# Patient Record
Sex: FEMALE | Race: WHITE | Employment: UNEMPLOYED | URBAN - METROPOLITAN AREA
[De-identification: names, ages, dates, MRNs, and addresses within clinical notes are randomized per-mention and may not be internally consistent; named-entity substitution may affect disease eponyms.]

---

## 2017-02-22 ENCOUNTER — HOSPITAL ENCOUNTER (OUTPATIENT)
Dept: SLEEP MEDICINE | Age: 22
Discharge: HOME | End: 2017-02-22
Payer: COMMERCIAL

## 2017-02-22 ENCOUNTER — TELEPHONE (OUTPATIENT)
Dept: SLEEP MEDICINE | Age: 22
End: 2017-02-22

## 2017-02-22 DIAGNOSIS — K21.9 ESOPHAGEAL REFLUX: ICD-10-CM

## 2017-02-22 DIAGNOSIS — F32.A DEPRESSION: ICD-10-CM

## 2017-02-22 DIAGNOSIS — G47.33 OSA (OBSTRUCTIVE SLEEP APNEA): ICD-10-CM

## 2017-02-22 PROCEDURE — 95810 POLYSOM 6/> YRS 4/> PARAM: CPT

## 2017-02-24 ENCOUNTER — DOCUMENTATION ONLY (OUTPATIENT)
Dept: SLEEP MEDICINE | Age: 22
End: 2017-02-24

## 2019-06-27 ENCOUNTER — OFFICE VISIT (OUTPATIENT)
Dept: FAMILY MEDICINE CLINIC | Age: 24
End: 2019-06-27

## 2019-06-27 VITALS
WEIGHT: 277 LBS | DIASTOLIC BLOOD PRESSURE: 85 MMHG | SYSTOLIC BLOOD PRESSURE: 122 MMHG | HEART RATE: 104 BPM | BODY MASS INDEX: 47.29 KG/M2 | TEMPERATURE: 96.7 F | OXYGEN SATURATION: 97 % | RESPIRATION RATE: 16 BRPM | HEIGHT: 64 IN

## 2019-06-27 DIAGNOSIS — R35.0 URINARY FREQUENCY: ICD-10-CM

## 2019-06-27 DIAGNOSIS — Z76.89 ENCOUNTER TO ESTABLISH CARE: ICD-10-CM

## 2019-06-27 DIAGNOSIS — R32 URINARY INCONTINENCE, UNSPECIFIED TYPE: ICD-10-CM

## 2019-06-27 DIAGNOSIS — R35.0 URINARY FREQUENCY: Primary | ICD-10-CM

## 2019-06-27 DIAGNOSIS — Z13.29 SCREENING FOR THYROID DISORDER: ICD-10-CM

## 2019-06-27 DIAGNOSIS — E66.01 OBESITY, MORBID (HCC): ICD-10-CM

## 2019-06-27 RX ORDER — VILAZODONE HYDROCHLORIDE 10 MG/1
20 TABLET ORAL DAILY
COMMUNITY

## 2019-06-27 RX ORDER — SUMATRIPTAN 100 MG/1
100 TABLET, FILM COATED ORAL
COMMUNITY

## 2019-06-27 RX ORDER — GABAPENTIN 300 MG/1
300 CAPSULE ORAL 3 TIMES DAILY
COMMUNITY

## 2019-06-27 RX ORDER — AMITRIPTYLINE HYDROCHLORIDE 25 MG/1
TABLET, FILM COATED ORAL
COMMUNITY

## 2019-06-27 RX ORDER — PRAZOSIN HYDROCHLORIDE 2 MG/1
2 CAPSULE ORAL
COMMUNITY

## 2019-06-27 NOTE — PROGRESS NOTES
Chief Complaint   Patient presents with   [facility] Establish Care    Urgency    Incontinence     urinary incontinence     1. Have you been to the ER, urgent care clinic since your last visit? Hospitalized since your last visit? [facility]    2. Have you seen or consulted any other health care providers outside of the [address] [place] [address] since your last visit? Include any pap smears or colon screening.  Neurology-Yury Anders (migraines), Endocrinologist-Dr Carrasco (pituitary tumor),

## 2019-06-27 NOTE — PROGRESS NOTES
ESTABLISH CARE VISIT    SUBJECTIVE:     Chief Complaint   Patient presents with    Establish Care    Urgency    Incontinence     urinary incontinence       HPI: 20 y.o.  female  has a past medical history of Autism, Clinical depression, Headache, Pituitary tumor, and Sleep disorder. is here for the above chief complaint(s). Headaches  Patient sees neurology, Dr. Cinthia Schneider. Patient was diagnosed with migraines, takes sumatriptan as needed. And last was 1-2 months. Next visit will be in 6mo-1 year. He refills sumatriptan. Patient states that she has worse migraines in the summer. Sleep study  Patient was dianosed with low O2 in the middle of the night, not sleep apnea. She does not wear CPAP machine 2 to anxiety concerns. Depression/PTSD  Patient sees a therapist, but she has not been seeing them  Renny Evans, last visit was 1 month ago. Meds were changed on Monday, now taking gabapentin 300mg BID and 600mg AT NIGHT. Patient also takes vybrid 20mg daily for depressionn. Patient denies SI, HI, AH, VH. Insomnia  Patient states that she takes amiptryptiline and elavil nightly for sleep. She has been on Klonipin in the psat and this did not work well for her. Patient tried melatonin. Patient states that insomnia is well controlled. Pituitary Tumor  2 months ago, MRI recent. Pituitary tumor was discovered 6 months ago, this was follow-up MRI. Sees endocrinology for management, will see them every 6 months. Social  Patient's father has frontotemporal dementia, some social struggles in the home. But managing as best as she can. Urinary symptoms  Patient was was having some urinary frequency starting about 3 months ago and the symptoms are intermittent. Patient 's mother states that she sees the patient going frequently through the day. She has no c/o urinary pain or burning. Patient was also having some back pain in the last 3 months. Patient does not currently have any back pain. Patient states that she has had an increase in bowel movements. Patient has incontinence, says that she when she has to go, she will hold it in public and then will be unable to make it to the bathroom in time. Patient has regular menstrual periods, no concerns. Denies any n/v. Patent states that she does have some diarrhea, but this is dependent on diet. She denies any other abdominal pain,n/v/d. Health Maintenance:    Eye -  no complaints, last eye exam: years,   Oral Health -  no complaints, last exam: needs to update  Hearing -  no complaints. U/A -  no UTI sxs. Cardiac- denies history, denies chest pain. GYN- never had, not sexually active  Colonoscopy - no colon cancer in the family, screen at 48      Review of Systems   Constitutional: Negative for chills, fever, malaise/fatigue and weight loss. Eyes: Negative for blurred vision, double vision and pain. Respiratory: Negative for cough, sputum production, shortness of breath and wheezing. Cardiovascular: Negative for chest pain, palpitations, orthopnea, claudication and leg swelling. Gastrointestinal: Negative for abdominal pain, constipation, diarrhea, nausea and vomiting. Genitourinary: Positive for frequency. Negative for dysuria, flank pain, hematuria and urgency. Neurological: Positive for headaches (chronic migraines, no new headaches). Negative for dizziness, tingling, tremors, sensory change, speech change, focal weakness, seizures and weakness. Psychiatric/Behavioral: Positive for depression. Negative for hallucinations, memory loss, substance abuse and suicidal ideas. The patient is nervous/anxious and has insomnia. @[facility]@  Current Outpatient Medications   Medication Sig    gabapentin (NEURONTIN) 300 mg capsule Take 300 mg by mouth three (3) times daily. I cap by mouth QAM and QNOON and 2 caps by mouth QHS    vilazodone (VIIBRYD) 10 mg tab tablet Take 20 mg by mouth daily.     amitriptyline (ELAVIL) 25 mg tablet Take  by mouth nightly.  prazosin (MINIPRESS) 2 mg capsule Take 2 mg by mouth nightly.  SUMAtriptan (IMITREX) 100 mg tablet Take 100 mg by mouth once as needed for Migraine. No current facility-administered medications for this visit. Health Maintenance   Topic Date Due    HPV Age 9Y-34Y (3 - Female 3-dose series) 07/06/2008    DTaP/Tdap/Td series (1 - Tdap) 07/06/2014    PAP AKA CERVICAL CYTOLOGY  07/06/2014    Influenza Age 5 to Adult  08/01/2019    Pneumococcal 0-64 years  Aged Out       Medications and Allergies: Reviewed and confirmed in the chart    Past Medical Hx: Reviewed and confirmed in the chart  Past Medical History:   Diagnosis Date    Autism     Clinical depression     Headache     Pituitary tumor     Sleep disorder        Patient Active Problem List   Diagnosis Code    Obesity, morbid ([facility] [address] E66.01    Autism F84.0    Clinical depression F32.9    Headache R51    Pituitary tumor D49.7    Sleep disorder G47.9       Family Hx, Surgical Hx, Social Hx: Reviewed and updated in EMR    OBJECTIVE:  Vitals:    06/27/19 1353   BP: 122/85   Pulse: (!) 104   Resp: 16   Temp: 96.7 °F (35.9 °C)   TempSrc: Oral   SpO2: 97%   Weight: 277 lb (125.6 kg)   Height: 5' 4\" (1.626 m)       BP Readings from Last 3 Encounters:   06/27/19 122/85     Wt Readings from Last 3 Encounters:   06/27/19 277 lb (125.6 kg)       Physical Exam   Constitutional: She is oriented to person, place, and time and well-developed, well-nourished, and in no distress. No distress. Neck: Normal range of motion. Neck supple. No thyromegaly present. Cardiovascular: Normal rate, regular rhythm, normal heart sounds and intact distal pulses. Exam reveals no gallop and no friction rub. No murmur heard. Pulmonary/Chest: Effort normal and breath sounds normal. No respiratory distress. She has no wheezes. She has no rales. She exhibits no tenderness. Abdominal: Soft. Normal appearance.  There is no hepatosplenomegaly. There is no tenderness. There is no CVA tenderness. Lymphadenopathy:     She has no cervical adenopathy. Neurological: She is alert and oriented to person, place, and time. Skin: Skin is warm and dry. She is not diaphoretic. Psychiatric: Mood, memory, affect and judgment normal.   Nursing note and vitals reviewed. Nursing Notes Reviewed    ASSESSMENT AND PLAN  Diagnoses and all orders for this visit:    1. Urinary frequency  -     XR ABD (KUB); Future  -     CULTURE, URINE; Future  -     URINALYSIS W/ RFLX MICROSCOPIC; Future  -     CULTURE, URINE; Future    2. Obesity, morbid ([facility] [address]  The patient is asked to make an attempt to improve diet and exercise patterns to aid in medical management of this problem. 3. Urinary incontinence, unspecified type  -     METABOLIC PANEL, COMPREHENSIVE; Future  -     HEMOGLOBIN A1C WITH EAG; Future  -     XR ABD (KUB); Future  -     CULTURE, URINE; Future    4. Screening for thyroid disorder  -     TSH 3RD GENERATION; Future  -     T4, FREE; Future    5. Encounter to establish care  -     CBC WITH AUTOMATED DIFF; Future  -     METABOLIC PANEL, COMPREHENSIVE; Future  -     T4, FREE  -     TSH 3RD GENERATION  -     HEMOGLOBIN A1C W/O EAG        Orders Placed This Encounter    gabapentin (NEURONTIN) 300 mg capsule    vilazodone (VIIBRYD) 10 mg tab tablet    amitriptyline (ELAVIL) 25 mg tablet    prazosin (MINIPRESS) 2 mg capsule    SUMAtriptan (IMITREX) 100 mg tablet         I have discussed the diagnosis with the patient and the intended plan as seen in the above orders. The patient has received an after-visit summary and questions were answered concerning future plans. I have discussed medication side effects and warnings with the patient as well. I have reviewed the plan of care with the patient, accepted their input and they are in agreement with the treatment goals.     More than 50% of this 30 min visit was spent counseling the patient face to face about etiology and treatment of health conditions outlined in assessment and plan        Sherry Tan [address], [address] [place] [address], [address] [place], [place] [address] [phone]  [place] [phone]  [phone]

## 2019-06-28 ENCOUNTER — TELEPHONE (OUTPATIENT)
Dept: FAMILY MEDICINE CLINIC | Age: 24
End: 2019-06-28

## 2019-06-28 DIAGNOSIS — R32 URINARY INCONTINENCE, UNSPECIFIED TYPE: ICD-10-CM

## 2019-06-28 DIAGNOSIS — Z13.29 SCREENING FOR THYROID DISORDER: ICD-10-CM

## 2019-06-28 DIAGNOSIS — Z76.89 ENCOUNTER TO ESTABLISH CARE: ICD-10-CM

## 2019-06-28 LAB
A-G RATIO,AGRAT: 1.7 RATIO (ref 1.1–2.6)
ABSOLUTE LYMPHOCYTE COUNT, 10803: 2.5 K/UL (ref 1–4.8)
ALBUMIN SERPL-MCNC: 4.5 G/DL (ref 3.5–5)
ALP SERPL-CCNC: 62 U/L (ref 25–115)
ALT SERPL-CCNC: 27 U/L (ref 5–40)
ANION GAP SERPL CALC-SCNC: 19 MMOL/L
AST SERPL W P-5'-P-CCNC: 29 U/L (ref 10–37)
AVG GLU, 10930: 111 MG/DL (ref 91–123)
BASOPHILS # BLD: 0 K/UL (ref 0–0.2)
BASOPHILS NFR BLD: 0 % (ref 0–2)
BILIRUB SERPL-MCNC: 0.5 MG/DL (ref 0.2–1.2)
BILIRUB UR QL: NEGATIVE
BUN SERPL-MCNC: 14 MG/DL (ref 6–22)
CALCIUM SERPL-MCNC: 9.5 MG/DL (ref 8.4–10.5)
CHLORIDE SERPL-SCNC: 98 MMOL/L (ref 98–110)
CO2 SERPL-SCNC: 22 MMOL/L (ref 20–32)
CREAT SERPL-MCNC: 0.7 MG/DL (ref 0.5–1.2)
EOSINOPHIL # BLD: 0.2 K/UL (ref 0–0.5)
EOSINOPHIL NFR BLD: 1 % (ref 0–6)
ERYTHROCYTE [DISTWIDTH] IN BLOOD BY AUTOMATED COUNT: 15.6 % (ref 10–15.5)
GFRAA, 66117: >60
GFRNA, 66118: >60
GLOBULIN,GLOB: 2.7 G/DL (ref 2–4)
GLUCOSE SERPL-MCNC: 48 MG/DL (ref 70–99)
GLUCOSE UR QL: NEGATIVE MG/DL
GRANULOCYTES,GRANS: 69 % (ref 40–75)
HBA1C MFR BLD HPLC: 5.5 % (ref 4.8–5.9)
HCT VFR BLD AUTO: 39.2 % (ref 35.1–46.5)
HGB BLD-MCNC: 12.1 G/DL (ref 11.7–15.5)
HGB UR QL STRIP: NEGATIVE
KETONES UR QL STRIP.AUTO: NEGATIVE MG/DL
LEUKOCYTE ESTERASE: NEGATIVE
LYMPHOCYTES, LYMLT: 24 % (ref 20–45)
MCH RBC QN AUTO: 25 PG (ref 26–34)
MCHC RBC AUTO-ENTMCNC: 31 G/DL (ref 31–36)
MCV RBC AUTO: 82 FL (ref 80–95)
MONOCYTES # BLD: 0.7 K/UL (ref 0.1–1)
MONOCYTES NFR BLD: 6 % (ref 3–12)
NEUTROPHILS # BLD AUTO: 7.3 K/UL (ref 1.8–7.7)
NITRITE UR QL STRIP.AUTO: NEGATIVE
PH UR STRIP: 5 PH (ref 5–8)
PLATELET # BLD AUTO: 407 K/UL (ref 140–440)
PMV BLD AUTO: 9.8 FL (ref 9–13)
POTASSIUM SERPL-SCNC: 5.1 MMOL/L (ref 3.5–5.5)
PROT SERPL-MCNC: 7.2 G/DL (ref 6.4–8.3)
PROT UR QL STRIP: NEGATIVE MG/DL
RBC # BLD AUTO: 4.78 M/UL (ref 3.8–5.2)
SODIUM SERPL-SCNC: 139 MMOL/L (ref 133–145)
SP GR UR: 1.03 (ref 1–1.03)
T4 FREE SERPL-MCNC: 1.3 NG/DL (ref 0.9–1.8)
T4 FREE SERPL-MCNC: 1.3 NG/DL (ref 0.9–1.8)
TSH SERPL DL<=0.005 MIU/L-ACNC: 1.2 MCU/ML (ref 0.27–4.2)
TSH SERPL DL<=0.005 MIU/L-ACNC: 1.2 MCU/ML (ref 0.27–4.2)
UROBILINOGEN UR STRIP-MCNC: <2 MG/DL
WBC # BLD AUTO: 10.6 K/UL (ref 4–11)

## 2019-06-28 NOTE — TELEPHONE ENCOUNTER
[facility]    Glucose 48    Verified patient , Name, Repeated back critical lab value. I called patient, no answer, left voicemail to call office back asap. Informed pcp.

## 2019-06-29 LAB
A-G RATIO,AGRAT: 1.7 RATIO (ref 1.1–2.6)
ABSOLUTE LYMPHOCYTE COUNT, 10803: 2.5 K/UL (ref 1–4.8)
ALBUMIN SERPL-MCNC: 4.5 G/DL (ref 3.5–5)
ALP SERPL-CCNC: 62 U/L (ref 25–115)
ALT SERPL-CCNC: 27 U/L (ref 5–40)
ANION GAP SERPL CALC-SCNC: 19 MMOL/L
AST SERPL W P-5'-P-CCNC: 29 U/L (ref 10–37)
BASOPHILS # BLD: 0 K/UL (ref 0–0.2)
BASOPHILS NFR BLD: 0 % (ref 0–2)
BILIRUB SERPL-MCNC: 0.5 MG/DL (ref 0.2–1.2)
BILIRUB UR QL: NEGATIVE
BUN SERPL-MCNC: 14 MG/DL (ref 6–22)
CALCIUM SERPL-MCNC: 9.5 MG/DL (ref 8.4–10.5)
CHLORIDE SERPL-SCNC: 98 MMOL/L (ref 98–110)
CO2 SERPL-SCNC: 22 MMOL/L (ref 20–32)
CREAT SERPL-MCNC: 0.7 MG/DL (ref 0.5–1.2)
EOSINOPHIL # BLD: 0.2 K/UL (ref 0–0.5)
EOSINOPHIL NFR BLD: 1 % (ref 0–6)
ERYTHROCYTE [DISTWIDTH] IN BLOOD BY AUTOMATED COUNT: 15.6 % (ref 10–15.5)
GFRAA, 66117: >60
GFRNA, 66118: >60
GLOBULIN,GLOB: 2.7 G/DL (ref 2–4)
GLUCOSE SERPL-MCNC: 48 MG/DL (ref 70–99)
GLUCOSE UR QL: NEGATIVE MG/DL
GRANULOCYTES,GRANS: 69 % (ref 40–75)
HCT VFR BLD AUTO: 39.2 % (ref 35.1–46.5)
HGB BLD-MCNC: 12.1 G/DL (ref 11.7–15.5)
HGB UR QL STRIP: NEGATIVE
KETONES UR QL STRIP.AUTO: NEGATIVE MG/DL
LEUKOCYTE ESTERASE: NEGATIVE
LYMPHOCYTES, LYMLT: 24 % (ref 20–45)
MCH RBC QN AUTO: 25 PG (ref 26–34)
MCHC RBC AUTO-ENTMCNC: 31 G/DL (ref 31–36)
MCV RBC AUTO: 82 FL (ref 80–95)
MONOCYTES # BLD: 0.7 K/UL (ref 0.1–1)
MONOCYTES NFR BLD: 6 % (ref 3–12)
NEUTROPHILS # BLD AUTO: 7.3 K/UL (ref 1.8–7.7)
NITRITE UR QL STRIP.AUTO: NEGATIVE
PH UR STRIP: 5 PH (ref 5–8)
PLATELET # BLD AUTO: 407 K/UL (ref 140–440)
PMV BLD AUTO: 9.8 FL (ref 9–13)
POTASSIUM SERPL-SCNC: 5.1 MMOL/L (ref 3.5–5.5)
PROT SERPL-MCNC: 7.2 G/DL (ref 6.4–8.3)
PROT UR QL STRIP: NEGATIVE MG/DL
RBC # BLD AUTO: 4.78 M/UL (ref 3.8–5.2)
RESULT: NORMAL
SODIUM SERPL-SCNC: 139 MMOL/L (ref 133–145)
SP GR UR: 1.03 (ref 1–1.03)
T4 FREE SERPL-MCNC: 1.3 NG/DL (ref 0.9–1.8)
TSH SERPL DL<=0.005 MIU/L-ACNC: 1.2 MCU/ML (ref 0.27–4.2)
UROBILINOGEN UR STRIP-MCNC: <2 MG/DL
WBC # BLD AUTO: 10.6 K/UL (ref 4–11)

## 2019-07-03 ENCOUNTER — OFFICE VISIT (OUTPATIENT)
Dept: FAMILY MEDICINE CLINIC | Age: 24
End: 2019-07-03

## 2019-07-03 VITALS
OXYGEN SATURATION: 97 % | HEART RATE: 105 BPM | DIASTOLIC BLOOD PRESSURE: 80 MMHG | RESPIRATION RATE: 18 BRPM | SYSTOLIC BLOOD PRESSURE: 126 MMHG | HEIGHT: 64 IN | TEMPERATURE: 97.6 F | BODY MASS INDEX: 47.46 KG/M2 | WEIGHT: 278 LBS

## 2019-07-03 DIAGNOSIS — N39.0 URINARY TRACT INFECTION WITHOUT HEMATURIA, SITE UNSPECIFIED: Primary | ICD-10-CM

## 2019-07-03 RX ORDER — SULFAMETHOXAZOLE AND TRIMETHOPRIM 800; 160 MG/1; MG/1
1 TABLET ORAL 2 TIMES DAILY
Qty: 6 TAB | Refills: 0 | Status: SHIPPED | OUTPATIENT
Start: 2019-07-03 | End: 2019-07-06

## 2019-07-03 NOTE — PROGRESS NOTES
Chief Complaint   Patient presents with    Results     1. Have you been to the ER, urgent care clinic since your last visit? Hospitalized since your last visit? No    2. Have you seen or consulted any other health care providers outside of the [address] [place] [address] since your last visit? Include any pap smears or colon screening.  No

## 2019-07-03 NOTE — PROGRESS NOTES
Follow Up Visit Note    Chief Complaint   Patient presents with    Results    Skin Exam     spot on back       HPI:  Yolanda Rios is a 20 y.o.  female  has a past medical history of Autism, Clinical depression, Headache, Pituitary tumor, PTSD (post-traumatic stress disorder), and Sleep disorder. is here for the above complaint(s). Urinary Frequency  Patient continues to have urinary frequency and occasional incontinence. She only has incontinence when she is out and has fear of using a public restroom. Her urine result all came back negative. Urine culture shows:  --FINAL REPORT--   Mixed Culture   >100,000 col/mL, more than 2 different organisms.  Culture appears   contaminated   with skin letty. CMP- wnl  Discussed with patient and mother the hypoglycemia lab value. Likely erroneos because labs are sent out. Patient denies excessive thirst, hypoglycemic events (lightheadedness/dizziness), nausea, vomiting, shakiness. a1c - 5.5       Review of Systems   Constitutional: Negative for chills, fever, malaise/fatigue and weight loss. Eyes: Negative for blurred vision, double vision and pain. Respiratory: Negative for cough, sputum production, shortness of breath and wheezing. Cardiovascular: Negative for chest pain, palpitations, orthopnea, claudication and leg swelling. Gastrointestinal: Negative for abdominal pain, constipation, diarrhea, nausea and vomiting. Genitourinary: Negative for dysuria, frequency and urgency. Neurological: Negative for dizziness, tingling and headaches. Current Outpatient Medications   Medication Sig    trimethoprim-sulfamethoxazole (BACTRIM DS, SEPTRA DS) 160-800 mg per tablet Take 1 Tab by mouth two (2) times a day for 3 days.  gabapentin (NEURONTIN) 300 mg capsule Take 300 mg by mouth three (3) times daily. I cap by mouth QAM and QNOON and 2 caps by mouth QHS    vilazodone (VIIBRYD) 10 mg tab tablet Take 20 mg by mouth daily.     amitriptyline (ELAVIL) 25 mg tablet Take  by mouth nightly.  prazosin (MINIPRESS) 2 mg capsule Take 2 mg by mouth nightly.  SUMAtriptan (IMITREX) 100 mg tablet Take 100 mg by mouth once as needed for Migraine. No current facility-administered medications for this visit. Health Maintenance   Topic Date Due    HPV Age 9Y-34Y (1 - Female 3-dose series) 07/06/2008    DTaP/Tdap/Td series (1 - Tdap) 07/06/2014    PAP AKA CERVICAL CYTOLOGY  07/06/2014    Influenza Age 5 to Adult  08/01/2019    Pneumococcal 0-64 years  Aged Out       There is no immunization history on file for this patient. Allergies and Medications: Reviewed and updated in EMR. Past Medical History:   Diagnosis Date    Autism     Clinical depression     Headache     Pituitary tumor     PTSD (post-traumatic stress disorder)     Sleep disorder        Surgical History: Reviewed and updated in EMR as appropriate. Social History: Reviewed and updated in EMR as appropriate. Family History: Reviewed and updated in EMR as appropriate. OBJECTIVE:   Visit Vitals  /80   Pulse (!) 105   Temp 97.6 °F (36.4 °C) (Oral)   Resp 18   Ht 5' 4\" (1.626 m)   Wt 278 lb (126.1 kg)   LMP 06/05/2019 (Approximate)   SpO2 97%   BMI 47.72 kg/m²        Physical Exam   Constitutional: She is oriented to person, place, and time and well-developed, well-nourished, and in no distress. No distress. Neck: Normal range of motion. Neck supple. No thyromegaly present. Cardiovascular: Normal rate, regular rhythm, normal heart sounds and intact distal pulses. Exam reveals no gallop and no friction rub. No murmur heard. Pulmonary/Chest: Effort normal and breath sounds normal. No respiratory distress. She has no wheezes. She has no rales. She exhibits no tenderness. Lymphadenopathy:     She has no cervical adenopathy. Neurological: She is alert and oriented to person, place, and time. Skin: Skin is warm and dry. She is not diaphoretic. Psychiatric: Mood, memory, affect and judgment normal.   Vitals reviewed. LABS/RADIOLOGICAL TESTS:  Lab Results   Component Value Date/Time    WBC 10.6 06/27/2019 03:15 PM    WBC 10.6 06/27/2019 03:15 PM    WBC 10.6 06/27/2019 03:15 PM    WBC 10.6 06/27/2019 03:15 PM    WBC 10.6 06/27/2019 03:15 PM    HGB 12.1 06/27/2019 03:15 PM    HGB 12.1 06/27/2019 03:15 PM    HGB 12.1 06/27/2019 03:15 PM    HGB 12.1 06/27/2019 03:15 PM    HGB 12.1 06/27/2019 03:15 PM    HCT 39.2 06/27/2019 03:15 PM    HCT 39.2 06/27/2019 03:15 PM    HCT 39.2 06/27/2019 03:15 PM    HCT 39.2 06/27/2019 03:15 PM    HCT 39.2 06/27/2019 03:15 PM    PLATELET 156 03/01/3326 03:15 PM    PLATELET 058 57/02/8221 03:15 PM    PLATELET 757 41/82/6458 03:15 PM    PLATELET 643 11/13/0601 03:15 PM    PLATELET 629 02/51/5892 03:15 PM     Lab Results   Component Value Date/Time    Sodium 139 06/27/2019 03:15 PM    Sodium 139 06/27/2019 03:15 PM    Potassium 5.1 06/27/2019 03:15 PM    Potassium 5.1 06/27/2019 03:15 PM    Chloride 98 06/27/2019 03:15 PM    Chloride 98 06/27/2019 03:15 PM    CO2 22 06/27/2019 03:15 PM    CO2 22 06/27/2019 03:15 PM    Glucose 48 (LL) 06/27/2019 03:15 PM    Glucose 48 (LL) 06/27/2019 03:15 PM    BUN 14 06/27/2019 03:15 PM    BUN 14 06/27/2019 03:15 PM    Creatinine 0.7 06/27/2019 03:15 PM    Creatinine 0.7 06/27/2019 03:15 PM     No results found for: CHOL, CHOLX, CHLST, CHOLV, HDL, LDL, LDLC, DLDLP, TGLX, TRIGL, TRIGP  No results found for: GPT  Lab Results   Component Value Date/Time    Hemoglobin A1c 5.5 06/27/2019 03:15 PM         All lab results and radiological studies were reviewed and discussed with the patient. ASSESSMENT/PLAN:    Diagnoses and all orders for this visit:    1.  Urinary tract infection without hematuria, site unspecified  Discussed with mother and patient treatment options, mother feels that the frequency in urination is likely psychogenic, but thinks trying an antibiotic will give everyone piece of mind. Patient following closely with therapist as well due to incresaed stress in her life. -     trimethoprim-sulfamethoxazole (BACTRIM DS, SEPTRA DS) 160-800 mg per tablet; Take 1 Tab by mouth two (2) times a day for 3 days. ICD-10-CM ICD-9-CM    1. Urinary tract infection without hematuria, site unspecified N39.0 599.0        Requested Prescriptions     Signed Prescriptions Disp Refills    trimethoprim-sulfamethoxazole (BACTRIM DS, SEPTRA DS) 160-800 mg per tablet 6 Tab 0     Sig: Take 1 Tab by mouth two (2) times a day for 3 days. Patient verbalized understanding and agreement with the plan. Patient was given an after-visit summary. Follow-up in 6 months or sooner if worsening symptoms. More than 50% of this 15 min visit was spent counseling the patient face to face about etiology and treatment of health conditions outlined in assessment and plan        Sherry Duong PA-C  [address], [address] [place] [address], [address] [place] [address] [phone]  [facility]

## 2019-10-03 ENCOUNTER — OFFICE VISIT (OUTPATIENT)
Dept: FAMILY MEDICINE CLINIC | Age: 24
End: 2019-10-03

## 2019-10-03 VITALS
BODY MASS INDEX: 45.75 KG/M2 | OXYGEN SATURATION: 97 % | TEMPERATURE: 98.5 F | DIASTOLIC BLOOD PRESSURE: 82 MMHG | WEIGHT: 268 LBS | HEIGHT: 64 IN | RESPIRATION RATE: 16 BRPM | SYSTOLIC BLOOD PRESSURE: 129 MMHG | HEART RATE: 96 BPM

## 2019-10-03 DIAGNOSIS — Z00.00 PHYSICAL EXAM: Primary | ICD-10-CM

## 2019-10-03 DIAGNOSIS — Z13.220 SCREENING, LIPID: ICD-10-CM

## 2019-10-03 NOTE — PROGRESS NOTES
Chief Complaint   Patient presents with    Physical     1. Have you been to the ER, urgent care clinic since your last visit? Hospitalized since your last visit? No    2. Have you seen or consulted any other health care providers outside of the [address] [place] [address] since your last visit? Include any pap smears or colon screening.  No

## 2019-10-04 DIAGNOSIS — Z00.00 PHYSICAL EXAM: ICD-10-CM

## 2019-10-04 DIAGNOSIS — Z13.220 SCREENING, LIPID: ICD-10-CM

## 2019-10-07 NOTE — PROGRESS NOTES
SUBJECTIVE:   Marvin George is a 30 y.o. female presenting for his annual checkup. HPI:    Current Outpatient Medications   Medication Sig Dispense Refill    gabapentin (NEURONTIN) 300 mg capsule Take 300 mg by mouth three (3) times daily. I cap by mouth QAM and QNOON and 2 caps by mouth QHS      vilazodone (VIIBRYD) 10 mg tab tablet Take 20 mg by mouth daily.  amitriptyline (ELAVIL) 25 mg tablet Take  by mouth nightly.  prazosin (MINIPRESS) 2 mg capsule Take 2 mg by mouth nightly.  SUMAtriptan (IMITREX) 100 mg tablet Take 100 mg by mouth once as needed for Migraine. Allergies: Amoxicillin     ROS:  Feeling well. No dyspnea or chest pain on exertion. No abdominal pain, change in bowel habits, black or bloody stools. No urinary tract or prostatic symptoms. No neurological complaints. OBJECTIVE:   Visit Vitals  /82   Pulse 96   Temp 98.5 °F (36.9 °C) (Oral)   Resp 16   Ht 5' 4\" (1.626 m)   Wt 268 lb (121.6 kg)   LMP 09/06/2019 (Approximate)   SpO2 97%   BMI 46.00 kg/m²    Body mass index is 46 kg/m². Gen: The patient appears well, alert, oriented x 3, in no distress. ENT:External ears and TM normal bilat. Neck supple. No adenopathy or thyromegaly. Eyes: PERRLA. Conjunctiva normal.  Pulm: Lungs are clear, good air entry, no wheezes, rhonchi or rales. CV: S1 and S2 normal, no murmurs, regular rate and rhythm. show no edema, normal peripheral pulses. GI: Abdomen is soft, nontender. no guarding, mass or organomegaly.  exam:declined  MS: No joint deformity or tenderness. Neuro: No focal sensory or motor deficits. ASSESSMENT/PLAN:   begin progressive daily aerobic exercise program, follow a low fat, low cholesterol diet and attempt to lose weight     Obesity: Discussed th e patient's BMI with her. The BMI follow up plan is as follows: I have counseled this patient on diet and exercise regimens.     Orders Placed This Encounter    LIPID PANEL     Standing Status:   Future     Number of Occurrences:   1     Standing Expiration Date:   10/3/2020